# Patient Record
Sex: FEMALE | Race: WHITE | URBAN - METROPOLITAN AREA
[De-identification: names, ages, dates, MRNs, and addresses within clinical notes are randomized per-mention and may not be internally consistent; named-entity substitution may affect disease eponyms.]

---

## 2021-05-28 ENCOUNTER — OUTPATIENT (OUTPATIENT)
Dept: OUTPATIENT SERVICES | Facility: HOSPITAL | Age: 41
LOS: 1 days | End: 2021-05-28
Payer: COMMERCIAL

## 2021-05-28 PROCEDURE — 73502 X-RAY EXAM HIP UNI 2-3 VIEWS: CPT

## 2021-05-28 PROCEDURE — 73502 X-RAY EXAM HIP UNI 2-3 VIEWS: CPT | Mod: 26,RT

## 2022-08-09 ENCOUNTER — NON-APPOINTMENT (OUTPATIENT)
Age: 42
End: 2022-08-09

## 2022-08-09 DIAGNOSIS — Z86.59 PERSONAL HISTORY OF OTHER MENTAL AND BEHAVIORAL DISORDERS: ICD-10-CM

## 2022-08-09 DIAGNOSIS — Z87.891 PERSONAL HISTORY OF NICOTINE DEPENDENCE: ICD-10-CM

## 2022-08-09 DIAGNOSIS — Z87.09 PERSONAL HISTORY OF OTHER DISEASES OF THE RESPIRATORY SYSTEM: ICD-10-CM

## 2022-08-09 DIAGNOSIS — Z86.39 PERSONAL HISTORY OF OTHER ENDOCRINE, NUTRITIONAL AND METABOLIC DISEASE: ICD-10-CM

## 2022-08-09 DIAGNOSIS — Z87.2 PERSONAL HISTORY OF DISEASES OF THE SKIN AND SUBCUTANEOUS TISSUE: ICD-10-CM

## 2022-08-09 PROBLEM — Z00.00 ENCOUNTER FOR PREVENTIVE HEALTH EXAMINATION: Status: ACTIVE | Noted: 2022-08-09

## 2022-08-09 RX ORDER — DULOXETINE HYDROCHLORIDE 60 MG/1
60 CAPSULE, DELAYED RELEASE PELLETS ORAL
Refills: 0 | Status: ACTIVE | COMMUNITY

## 2022-08-15 ENCOUNTER — APPOINTMENT (OUTPATIENT)
Dept: GASTROENTEROLOGY | Facility: CLINIC | Age: 42
End: 2022-08-15

## 2022-08-15 VITALS
RESPIRATION RATE: 16 BRPM | HEART RATE: 88 BPM | OXYGEN SATURATION: 98 % | BODY MASS INDEX: 27.6 KG/M2 | DIASTOLIC BLOOD PRESSURE: 70 MMHG | WEIGHT: 150 LBS | SYSTOLIC BLOOD PRESSURE: 104 MMHG | HEIGHT: 62 IN | TEMPERATURE: 97.2 F

## 2022-08-15 DIAGNOSIS — Z80.51 FAMILY HISTORY OF MALIGNANT NEOPLASM OF KIDNEY: ICD-10-CM

## 2022-08-15 DIAGNOSIS — L29.0 PRURITUS ANI: ICD-10-CM

## 2022-08-15 DIAGNOSIS — Z83.71 FAMILY HISTORY OF COLONIC POLYPS: ICD-10-CM

## 2022-08-15 DIAGNOSIS — Z82.0 FAMILY HISTORY OF EPILEPSY AND OTHER DISEASES OF THE NERVOUS SYSTEM: ICD-10-CM

## 2022-08-15 DIAGNOSIS — Z12.11 ENCOUNTER FOR SCREENING FOR MALIGNANT NEOPLASM OF COLON: ICD-10-CM

## 2022-08-15 DIAGNOSIS — K21.9 GASTRO-ESOPHAGEAL REFLUX DISEASE W/OUT ESOPHAGITIS: ICD-10-CM

## 2022-08-15 DIAGNOSIS — Z83.79 FAMILY HISTORY OF OTHER DISEASES OF THE DIGESTIVE SYSTEM: ICD-10-CM

## 2022-08-15 DIAGNOSIS — Z80.1 FAMILY HISTORY OF MALIGNANT NEOPLASM OF TRACHEA, BRONCHUS AND LUNG: ICD-10-CM

## 2022-08-15 DIAGNOSIS — K64.8 OTHER HEMORRHOIDS: ICD-10-CM

## 2022-08-15 PROCEDURE — 99204 OFFICE O/P NEW MOD 45 MIN: CPT

## 2022-08-15 RX ORDER — LEVOCETIRIZINE DIHYDROCHLORIDE 5 MG/1
5 TABLET ORAL
Qty: 30 | Refills: 0 | Status: ACTIVE | COMMUNITY
Start: 2022-08-09

## 2022-08-15 RX ORDER — DEXTROAMPHETAMINE SACCHARATE, AMPHETAMINE ASPARTATE, DEXTROAMPHETAMINE SULFATE AND AMPHETAMINE SULFATE 7.5; 7.5; 7.5; 7.5 MG/1; MG/1; MG/1; MG/1
30 TABLET ORAL
Qty: 30 | Refills: 0 | Status: ACTIVE | COMMUNITY
Start: 2022-07-16

## 2022-08-15 NOTE — ASSESSMENT
[FreeTextEntry1] : \par 1. Pruritus Ani:\par    on MIGUELITO:  2 small nodules at 6' oclock,  small 1 st deg int hemorrhoids at 5 0clock \par    May represent a thrombosed hemorrhoid in evolution\par    However, because of its soft tissue component will further evaluation\par \par P: rec colorectal evaluation --> may need bx or excision\par      can try anamantle + Calmoseptine oint BID \par \par P:      Avoid manipulating : scratching/ scrubbing the area\par          avoid common dietary irritants: caffeine conatining foods/beverages, tomato-based products, lactose, ETOH--lennie beer/red wine \par          gentle wash w warm water only , no soap, and pat dry\par          avoid: soaps, scented toilet paper, anesthetic creams\par          Balneol lotion as directed: on cotton balls\par          wear loose cotton undergarments\par          Diet:  moderate  fiber , low fodmaps:  avoid straining and or diarrhea\par \par \par \par \par \par \par 1. Hemorrhoids:   No pain,  swelling,  ++  itch,  s/p  bleeding\par * Discussed the  potential complications of thrombosis,  pain,  infection,  swelling, itching,  bleeding \par Recommendations: \par * Moderate-   Fiber Diet was reviewed and emphasized\par * 6  --  8 cups of decaffeinated fluid daily was emphasized \par * Sitz Bathes as needed ,   +   Anamantle  Cream  [place] BID -- was needed\par * No:  Tucks BID,  Balneol Lotion,   Calmoseptine Oint -- was needed ;    can use  Prep H prn\par *  ++  need for  Colorectal surgical evaluation for possible ablation \par \par \par \par \par \par 2. GERD:  well  -  controlled,  no ht burn,  dysphagia,  throat clear\par * No LPR,  Martin’s w / w/o Dysplasia,  or h/o  Esophagitis grade: A--  was found \par \par Recommend: \par * Anti-reflux diet & life-style changes reviewed & re-emphasized.  \par * HOB elevation /  Bedge use emphasized\par * Weight reduction & regular exercise emphasized\par \par * ++ PPI use :  can taper to Omep 10mg qd       --  as needed was emphasized\par No need for  H2B q HS:  \par No need for Carafate  1 gram \par I previously reviewed & summarized the prospective randomized & retrospective non-randomized studies\par looking at potential long term SE's of PPIs, w special attention to associations & actual cause\par as related to GI infections, bone loss, cognitive changes, KD, Covid, vitamin & electrolyte deficiencies\par questions were answered, pt advised that PPIs should be used when needed as indicated by their\par clinical indication and response and tapering off is always the goal if possible. pt understood.\par \par * No  need for pH Monitor,  Manometry, or  Esophagram \par * No  need for ENT  eval/F/U, \par * No  need for  Surgical  eval  \par \par * No F/U  EGD: --for Barretts screening / surveillance needed \par \par \par \par \par \par \par \par 3. Colorectal  Neoplasia  Screening:  \par   Utilizing teaching posters and anatomical models the following were discussed and emphasized with the patient in detail: \par * Discussed the pre-malignant potential of polyps\par * Discussed the importance of f/u surveillance / screening colonoscopy \par * moderate-High  Fiber Diet was reviewed and emphasized\par * Anti-oxidants and ASA/NSAID Therapy emphasized\par * Given age > 40-51 yo & +FH Colon Polyps \par * Recommend Colonoscopy  to  R/O  Colonic Neoplasia-- in 2025\par \par \par \par \par \par \par

## 2022-08-15 NOTE — REVIEW OF SYSTEMS
[Red Eyes] : eyes not red [Confused] : no confusion [Proptosis] : no proptosis [Easy Bruising] : no tendency for easy bruising

## 2022-08-15 NOTE — PHYSICAL EXAM
[FreeTextEntry1] : at 12" oclock small skin tag, at 6 oclock but ~ 1cm from anal orifice -- 2 small raised sl tender nodules; at 5' a 1st deg int hemorrhoid

## 2022-08-15 NOTE — HISTORY OF PRESENT ILLNESS
[de-identified] :  \par This HPI  reflects a summary and review of records : including previous and most recent  Labs, body imaging, consults and progress notes, operative and pathology reports, EKG reports, ED records, found in [facility], [employer],  [employer] [facility] and any additional records brought in by  the patient at the time of the visit.\par \par \par PCP:  \par \par 42 yo F w h/o DM, HLD, Psoriatic Arthritis, Depression/Anxiety, Allergies\par Hemorrhoids\par \par 8/15/22    Today:  Feeling well, no c/o , CP, SOB/ SWIFT, Cough, Wheeze, Palpitations, edema\par \par   Today:   had a gastric sleeve in March, since then had irregular bowel habits\par                 First diarrhea, incr fiber, now BMs: # 4-5 qd, occas #7 which she calls dumping\par                 Recently c/o danny-anal discomfort, itching\par                felt something near the anus, a little tender, had bleeding in bowl once, no mucous\par                 Now just left w itching\par                 Tried Prep H--> didn’t really help\par                 Had GERD--> Ht burn pre & post Op, on omep 20, had been on 40mg \par * Abd pain-->no\par * Nausea--> no\par * Vomit--> no\par * Early satiety--> no\par * Belching--> no\par * Hiccups--> no\par * Regurgitation--> no\par * Acid Taste / Water Brash--> no\par * Ht burn--> occas \par * Dysphagia--> no\par * Throat Clearing--> no\par * Hoarseness--> no\par * Post-Nasal Drip--> no\par * Congestion--> no\par * Globus--> no\par * Cough--> no\par * Wheeze / PC-> -no\par * Constipation--> no\par * Diarrhea--> intermittnet \par * Bloating--> no\par * Strain on Defecation--> no\par * Incompl Evac--> no\par * Flatulence--> no\par * Gurgling--> no\par * Melena--> no\par * Anorexia--> no\par \par \par

## 2022-08-17 ENCOUNTER — APPOINTMENT (OUTPATIENT)
Dept: COLORECTAL SURGERY | Facility: CLINIC | Age: 42
End: 2022-08-17

## 2022-08-17 ENCOUNTER — NON-APPOINTMENT (OUTPATIENT)
Age: 42
End: 2022-08-17

## 2022-08-17 VITALS
SYSTOLIC BLOOD PRESSURE: 115 MMHG | DIASTOLIC BLOOD PRESSURE: 70 MMHG | TEMPERATURE: 97.9 F | HEART RATE: 85 BPM | RESPIRATION RATE: 18 BRPM | HEIGHT: 62 IN | BODY MASS INDEX: 29.26 KG/M2 | WEIGHT: 159 LBS | OXYGEN SATURATION: 99 %

## 2022-08-17 PROCEDURE — 99242 OFF/OP CONSLTJ NEW/EST SF 20: CPT | Mod: 25

## 2022-08-17 PROCEDURE — 46600 DIAGNOSTIC ANOSCOPY SPX: CPT

## 2022-08-17 RX ORDER — TRAMADOL HYDROCHLORIDE 50 MG/1
50 TABLET, COATED ORAL
Qty: 6 | Refills: 0 | Status: DISCONTINUED | COMMUNITY
Start: 2022-03-03 | End: 2022-08-17

## 2022-08-17 NOTE — ASSESSMENT
[FreeTextEntry1] : 41 F w/ symptomatic anal skin tags. She is keen to hjave them excised.\par Plan;\par Will schedule for EUA, anoscopy, excision of anal skin tags.\par Details of the procedure and possible complications discussed.\par All questions answered.\par

## 2022-08-17 NOTE — PHYSICAL EXAM
[Abdomen Masses] : No abdominal masses [Abdomen Tenderness] : ~T No ~M abdominal tenderness [No HSM] : no hepatosplenomegaly [Excoriation] : no perianal excoriation [Multiple Sinus Tracts] : no perianal sinus tracts [Fistula] : no fistulas [Wart] : no warts [Pilonidal Cyst] : no pilonidal cysts [Pilonidal Sinus] : no pilonidal sinus [Pilonidal Sinus Draining] : no pilonidal sinus drainage [Tender, Swollen] : nontender, non-swollen [Skin Tags] : residual hemorrhoidal skin tags were noted [Normal] : was normal [None] : there was no rectal abscess [No Rash or Lesion] : No rash or lesion [Alert] : alert [Oriented to Person] : oriented to person [Oriented to Place] : oriented to place [Oriented to Time] : oriented to time [Calm] : calm [de-identified] : Normal [de-identified] : posterior anal skin tag, and small anterior anal skin tag  [de-identified] : anoscopy done - grase 1 internal hemorrhoids [de-identified] : Normal [de-identified] : N [de-identified] : N [de-identified] : N [de-identified] : N

## 2022-08-17 NOTE — HISTORY OF PRESENT ILLNESS
[FreeTextEntry1] : 41 F here for a consultation for possible hemorrhoids. She has been referred to us by Dr White.\par Reporting a swelling/hemorrhoid which she noticed a month ago. symptoms include itching and occasional blood on wiping.\par Reports regular bowel movements.\par Underwent sleeve gastrectomy earlier this year.\par History of vaginal tear after child birth; and also hx of anal fissure which healed.\par For her current problem, she has tried applying a few creams without any improvement.

## 2022-08-19 ENCOUNTER — RESULT REVIEW (OUTPATIENT)
Age: 42
End: 2022-08-19

## 2022-08-21 ENCOUNTER — RESULT REVIEW (OUTPATIENT)
Age: 42
End: 2022-08-21

## 2022-08-22 ENCOUNTER — APPOINTMENT (OUTPATIENT)
Dept: COLORECTAL SURGERY | Facility: HOSPITAL | Age: 42
End: 2022-08-22

## 2022-08-22 PROCEDURE — ZZZZZ: CPT

## 2022-08-25 DIAGNOSIS — L91.8 OTHER HYPERTROPHIC DISORDERS OF THE SKIN: ICD-10-CM

## 2022-11-10 ENCOUNTER — RESULT REVIEW (OUTPATIENT)
Age: 42
End: 2022-11-10

## 2022-11-10 ENCOUNTER — APPOINTMENT (OUTPATIENT)
Dept: COLORECTAL SURGERY | Facility: CLINIC | Age: 42
End: 2022-11-10

## 2022-11-10 VITALS
DIASTOLIC BLOOD PRESSURE: 62 MMHG | HEART RATE: 96 BPM | BODY MASS INDEX: 28.71 KG/M2 | WEIGHT: 156 LBS | OXYGEN SATURATION: 99 % | TEMPERATURE: 98.2 F | SYSTOLIC BLOOD PRESSURE: 95 MMHG | HEIGHT: 62 IN

## 2022-11-10 DIAGNOSIS — K61.0 ANAL ABSCESS: ICD-10-CM

## 2022-11-10 PROCEDURE — 99213 OFFICE O/P EST LOW 20 MIN: CPT

## 2022-11-10 RX ORDER — DEXTROAMPHETAMINE SACCHARATE, AMPHETAMINE ASPARTATE, DEXTROAMPHETAMINE SULFATE AND AMPHETAMINE SULFATE 5; 5; 5; 5 MG/1; MG/1; MG/1; MG/1
20 TABLET ORAL
Qty: 30 | Refills: 0 | Status: COMPLETED | COMMUNITY
Start: 2022-05-16 | End: 2022-11-10

## 2022-11-10 RX ORDER — DEXTROAMPHETAMINE SACCHARATE, AMPHETAMINE ASPARTATE, DEXTROAMPHETAMINE SULFATE AND AMPHETAMINE SULFATE 2.5; 2.5; 2.5; 2.5 MG/1; MG/1; MG/1; MG/1
10 TABLET ORAL
Refills: 0 | Status: COMPLETED | COMMUNITY
End: 2022-11-10

## 2022-11-10 NOTE — HISTORY OF PRESENT ILLNESS
[FreeTextEntry1] : 42 year old female pt last seen on 8/17 for symptomatic anal skin tags\par Pt here for follow up\par \par Did have surgery in August to remove skin tags\par Pt feels fine after surgery never came back for follow up due to miscommunication\par But recently over the past month, feels that one of the skin tag has grown back but there is spotting of blood on the tissue\par Has an appointment with GI in [place] in Dec because it's closer to home\par She has episodes of leaking liquid from her rectum 2-3  times a week\par Has explosive diarrhea on and off since Jan. Can't figure out what triggers it\par History gastric sleeve in March 2022\par Since surgery has had frequent diarrhea\par Feels like her anal area is always dirty, like it's discolored dark color\par \par Primary concern is the regrowth of her skin tag, spotting of bleeding and anal leakage\par Never had a colonoscopy\par Mom had severe diverticulitis, passed away 2019 from lung cancer\par Father passed away this year

## 2022-11-10 NOTE — PHYSICAL EXAM
[No Rash or Lesion] : No rash or lesion [Alert] : alert [Oriented to Person] : oriented to person [Oriented to Place] : oriented to place [Oriented to Time] : oriented to time [Calm] : calm [de-identified] : Normal [de-identified] : Anterior anal fistula with external opening 1 cm from the anal verge; no skin tags; MIGUELITO internal opening palpable [de-identified] : Normal [de-identified] : Normal [de-identified] : Normal [de-identified] : Normal [de-identified] : Normal

## 2022-11-10 NOTE — ASSESSMENT
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 anterior anal fistula. \par Plan:\par Sitz baths.\par Simethicone for 'gas'.\par Will schedule for anoscopy, possible fistulotomy, possible drainage of abscess.\par Possible complications discussed.\par All questions answered.

## 2022-11-10 NOTE — REVIEW OF SYSTEMS
[As Noted in HPI] : as noted in HPI [Diarrhea] : diarrhea [Negative] : Heme/Lymph [FreeTextEntry7] : has a lot of very bad gas, incontinent of gas, foul smelling

## 2022-11-11 ENCOUNTER — NON-APPOINTMENT (OUTPATIENT)
Age: 42
End: 2022-11-11

## 2022-11-14 ENCOUNTER — TRANSCRIPTION ENCOUNTER (OUTPATIENT)
Age: 42
End: 2022-11-14

## 2022-11-22 ENCOUNTER — APPOINTMENT (OUTPATIENT)
Dept: COLORECTAL SURGERY | Facility: CLINIC | Age: 42
End: 2022-11-22

## 2022-11-22 ENCOUNTER — APPOINTMENT (OUTPATIENT)
Dept: COLORECTAL SURGERY | Facility: HOSPITAL | Age: 42
End: 2022-11-22

## 2022-11-22 PROCEDURE — ZZZZZ: CPT

## 2022-11-23 ENCOUNTER — NON-APPOINTMENT (OUTPATIENT)
Age: 42
End: 2022-11-23

## 2022-11-29 ENCOUNTER — NON-APPOINTMENT (OUTPATIENT)
Age: 42
End: 2022-11-29

## 2022-12-08 ENCOUNTER — APPOINTMENT (OUTPATIENT)
Dept: COLORECTAL SURGERY | Facility: CLINIC | Age: 42
End: 2022-12-08
Payer: COMMERCIAL

## 2023-01-04 ENCOUNTER — APPOINTMENT (OUTPATIENT)
Dept: COLORECTAL SURGERY | Facility: CLINIC | Age: 43
End: 2023-01-04
Payer: COMMERCIAL

## 2023-01-04 VITALS
BODY MASS INDEX: 27.42 KG/M2 | HEART RATE: 77 BPM | WEIGHT: 149 LBS | SYSTOLIC BLOOD PRESSURE: 109 MMHG | HEIGHT: 62 IN | DIASTOLIC BLOOD PRESSURE: 73 MMHG

## 2023-01-04 PROCEDURE — 99024 POSTOP FOLLOW-UP VISIT: CPT
